# Patient Record
Sex: MALE | Race: OTHER | HISPANIC OR LATINO
[De-identification: names, ages, dates, MRNs, and addresses within clinical notes are randomized per-mention and may not be internally consistent; named-entity substitution may affect disease eponyms.]

---

## 2018-11-08 ENCOUNTER — APPOINTMENT (OUTPATIENT)
Dept: UROLOGY | Facility: CLINIC | Age: 58
End: 2018-11-08
Payer: MEDICAID

## 2018-11-08 VITALS
BODY MASS INDEX: 22.93 KG/M2 | HEART RATE: 62 BPM | OXYGEN SATURATION: 96 % | WEIGHT: 141 LBS | DIASTOLIC BLOOD PRESSURE: 79 MMHG | TEMPERATURE: 98.5 F | HEIGHT: 65.75 IN | SYSTOLIC BLOOD PRESSURE: 129 MMHG | RESPIRATION RATE: 17 BRPM

## 2018-11-08 DIAGNOSIS — Z78.9 OTHER SPECIFIED HEALTH STATUS: ICD-10-CM

## 2018-11-08 DIAGNOSIS — D17.71 BENIGN LIPOMATOUS NEOPLASM OF KIDNEY: ICD-10-CM

## 2018-11-08 DIAGNOSIS — F17.200 NICOTINE DEPENDENCE, UNSPECIFIED, UNCOMPLICATED: ICD-10-CM

## 2018-11-08 PROCEDURE — 99204 OFFICE O/P NEW MOD 45 MIN: CPT

## 2018-12-06 ENCOUNTER — APPOINTMENT (OUTPATIENT)
Dept: UROLOGY | Facility: CLINIC | Age: 58
End: 2018-12-06
Payer: MEDICAID

## 2018-12-06 VITALS
TEMPERATURE: 98.5 F | DIASTOLIC BLOOD PRESSURE: 87 MMHG | OXYGEN SATURATION: 98 % | SYSTOLIC BLOOD PRESSURE: 135 MMHG | RESPIRATION RATE: 17 BRPM | HEART RATE: 64 BPM

## 2018-12-06 DIAGNOSIS — R31.21 ASYMPTOMATIC MICROSCOPIC HEMATURIA: ICD-10-CM

## 2018-12-06 PROCEDURE — 52000 CYSTOURETHROSCOPY: CPT

## 2019-06-27 ENCOUNTER — APPOINTMENT (OUTPATIENT)
Dept: UROLOGY | Facility: CLINIC | Age: 59
End: 2019-06-27
Payer: MEDICAID

## 2019-06-27 DIAGNOSIS — N20.0 CALCULUS OF KIDNEY: ICD-10-CM

## 2019-06-27 PROCEDURE — 76775 US EXAM ABDO BACK WALL LIM: CPT

## 2019-06-27 PROCEDURE — 99213 OFFICE O/P EST LOW 20 MIN: CPT | Mod: 25

## 2019-06-27 NOTE — HISTORY OF PRESENT ILLNESS
[FreeTextEntry1] : Patient is a 58 yo M who presents for kidney stones, microhematuria.  He had routine labs with his PCP showing UA with 3-10 RBCs/hpf.  He also had PSA 0.6.  He reports longstanding history of nephrolithiasis.  He has undergone ESWL and self passed stones in the past.  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  He is otherwise asymptomatic, no pain.

## 2019-06-27 NOTE — PHYSICAL EXAM
[General Appearance - Well Nourished] : well nourished [General Appearance - Well Developed] : well developed [Normal Appearance] : normal appearance [General Appearance - In No Acute Distress] : no acute distress [Well Groomed] : well groomed [Affect] : the affect was normal [Oriented To Time, Place, And Person] : oriented to person, place, and time [Mood] : the mood was normal [Not Anxious] : not anxious

## 2019-06-27 NOTE — ASSESSMENT
[FreeTextEntry1] : Patient is a 60 yo M who presents with asymptomatic microhematuria and nephrolithiasis.\par \par Intrarenal stone is larger on sono today.\par Counseled pt on stone dietary recommendations, such as increasing fluid intake and citrate intake (grover, grapefruit), while decreasing salt, protein and oxalate.\par Discussed with pt treatment options for stones including observation, ESWL, URS.  D/w pt that given size and growth, would consider intervention with either ESWL, URS.  He is interested in URS.  Explained procedure.  Instructed pt that I only go to [facility] for procedures, after this, pt does not want to travel to have surgery.  I encouraged him to seek alternative local urologist in [place] if he wants to have procedure done locally.\par He will consider, otherwise will plan for renal ULT in 6 mos.

## 2020-01-09 ENCOUNTER — APPOINTMENT (OUTPATIENT)
Dept: UROLOGY | Facility: CLINIC | Age: 60
End: 2020-01-09

## 2022-11-02 ENCOUNTER — APPOINTMENT (OUTPATIENT)
Dept: GASTROENTEROLOGY | Facility: CLINIC | Age: 62
End: 2022-11-02
Payer: MEDICAID

## 2022-11-02 DIAGNOSIS — Z01.818 ENCOUNTER FOR OTHER PREPROCEDURAL EXAMINATION: ICD-10-CM

## 2022-11-02 DIAGNOSIS — K63.5 POLYP OF COLON: ICD-10-CM

## 2022-11-02 PROCEDURE — 99204 OFFICE O/P NEW MOD 45 MIN: CPT

## 2022-11-02 RX ORDER — POLYETHYLENE GLYCOL-3350 AND ELECTROLYTES WITH FLAVOR PACK 240; 5.84; 2.98; 6.72; 22.72 G/278.26G; G/278.26G; G/278.26G; G/278.26G; G/278.26G
240 POWDER, FOR SOLUTION ORAL
Qty: 1 | Refills: 0 | Status: ACTIVE | COMMUNITY
Start: 2022-11-02 | End: 1900-01-01

## 2022-11-02 NOTE — PHYSICAL EXAM
[Alert] : alert [Normal Voice/Communication] : normal voice/communication [Healthy Appearing] : healthy appearing [No Acute Distress] : no acute distress [Sclera] : the sclera and conjunctiva were normal [Hearing Threshold Finger Rub Not [place]] : hearing was normal [Normal Lips/Gums] : the lips and gums were normal [Oropharynx] : the oropharynx was normal [Normal Appearance] : the appearance of the neck was normal [No Neck Mass] : no neck mass was observed [No Respiratory Distress] : no respiratory distress [No Acc Muscle Use] : no accessory muscle use [Respiration, Rhythm And Depth] : normal respiratory rhythm and effort [Auscultation Breath Sounds / Voice Sounds] : lungs were clear to auscultation bilaterally [Heart Rate And Rhythm] : heart rate was normal and rhythm regular [Normal S1, S2] : normal S1 and S2 [Murmurs] : no murmurs [Bowel Sounds] : normal bowel sounds [Abdomen Tenderness] : non-tender [No Masses] : no abdominal mass palpated [Abdomen Soft] : soft [] : no hepatosplenomegaly [Oriented To Time, Place, And Person] : oriented to person, place, and time

## 2022-11-08 NOTE — HISTORY OF PRESENT ILLNESS
[FreeTextEntry1] : This is a 62 year old male here for an evaluation of colonic polyp. PMH of ETOH abuse, kidney, gastritis, multiple sessile/flat tubular adenoma in 2015, 2016, 2018 , 2020. Denies a family history of colon CA, gastric CA, high risk polyps, Inflammatory and autoimmune disease. Patient had a colonoscopy on 10/6/22 which showed two large flat polyp at mid transverse colon. Pathology showed tubular adenoma. Patient denies any difficulty swallowing or reflux. No N&V or abdominal pain. No blood or dark tarry stools. Normal caliber. Weight stable. \par Smoke/Etoh: Current smoker, 40 1/2 pack years. Drinks 8-10oz of alcohol daily for 40 years. \par Medication: No AC or antiplatelet \par Allergy: none\par \par

## 2022-11-08 NOTE — REASON FOR VISIT
[Home] : at home, [unfilled] , at the time of the visit. [Medical Office: ([facility])___] : at the medical office located in  [[employer] ] : provided by [employer]   [Time Spent: ____ minutes] : Total time spent using  services: [unfilled] minutes. The patient's primary language is not English thus required  services. [Interpreters_IDNumber] : [id number] [Interpreters_FullName] : Rusty [FreeTextEntry3] : Mandarin

## 2022-11-08 NOTE — ASSESSMENT
[FreeTextEntry1] : This is a 62 year old male here for an evaluation of colonic polyp. \par \par Plan\par Colonoscopy with EMR \par [employer]\par Offered abdominal sono to assess the liver but declined. Will follow up with Dr. Domínguez \par Drinking and smoking cessation \par Covid swab \par \par Risks, benefits, and alternatives of colonoscopy discussed at length with patient including but not limited to bleeding, perforation, anesthesia related complication, aspiration, etc. Patient expressed understanding.\par \par Colonoscopy for evaluation of polyps, tumors, nodules with +/- biopsy and or cauterization w/ and or w/o clipping. \par  \par

## 2022-11-17 ENCOUNTER — APPOINTMENT (OUTPATIENT)
Dept: GASTROENTEROLOGY | Facility: HOSPITAL | Age: 62
End: 2022-11-17